# Patient Record
Sex: MALE | Race: WHITE | HISPANIC OR LATINO | Employment: FULL TIME | URBAN - METROPOLITAN AREA
[De-identification: names, ages, dates, MRNs, and addresses within clinical notes are randomized per-mention and may not be internally consistent; named-entity substitution may affect disease eponyms.]

---

## 2022-04-27 ENCOUNTER — OCCUPATIONAL MEDICINE (OUTPATIENT)
Dept: URGENT CARE | Facility: CLINIC | Age: 37
End: 2022-04-27
Payer: COMMERCIAL

## 2022-04-27 VITALS
TEMPERATURE: 98 F | HEIGHT: 63 IN | OXYGEN SATURATION: 98 % | BODY MASS INDEX: 22.15 KG/M2 | HEART RATE: 64 BPM | SYSTOLIC BLOOD PRESSURE: 120 MMHG | DIASTOLIC BLOOD PRESSURE: 60 MMHG | RESPIRATION RATE: 14 BRPM | WEIGHT: 125 LBS

## 2022-04-27 DIAGNOSIS — S39.012A STRAIN OF LUMBAR REGION, INITIAL ENCOUNTER: ICD-10-CM
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
## 2022-04-27 DIAGNOSIS — Z02.1 PRE-EMPLOYMENT DRUG SCREENING: ICD-10-CM

## 2022-04-27 LAB
AMP AMPHETAMINE: NORMAL
BREATH ALCOHOL COMMENT: NORMAL
COC COCAINE: NORMAL
INT CON NEG: NORMAL
INT CON POS: NORMAL
MET METHAMPHETAMINES: NORMAL
OPI OPIATES: NORMAL
PCP PHENCYCLIDINE: NORMAL
POC BREATHALIZER: 0 PERCENT (ref 0–0.01)
POC DRUG COMMENT 753798-OCCUPATIONAL HEALTH: NEGATIVE
THC: NORMAL

## 2022-04-27 PROCEDURE — 80305 DRUG TEST PRSMV DIR OPT OBS: CPT | Performed by: FAMILY MEDICINE

## 2022-04-27 PROCEDURE — 99203 OFFICE O/P NEW LOW 30 MIN: CPT | Performed by: FAMILY MEDICINE

## 2022-04-27 PROCEDURE — 82075 ASSAY OF BREATH ETHANOL: CPT | Performed by: FAMILY MEDICINE

## 2022-04-27 NOTE — LETTER
[facility] [address] - [place] [address]  Phone:  [phone] - Fax:  [phone]   [facility] Progress Report and Disability Certification  Date of Service: 4/27/2022   No Show:  No  Date / Time of Next Visit: 5/4/2022 AT 10:30 AM   Claim Information   Patient Name: Aishwarya Catalan  Claim Number:     Employer:  [employer] Date of Injury: 4/25/2022     Insurer / TPA: [employer]  ID / SSN:     Occupation: [employer] Diagnosis: The encounter diagnosis was Strain of lumbar region, initial encounter.    Medical Information   Related to Industrial Injury? Yes    Subjective Complaints:      DOI:  25 April          Back Pain  This is a new problem.    Pt was leaning over to lower a heavy log and felt acute pain in the lower back.          The problem occurs constantly. The problem is unchanged. The pain is present in the lumbar spine. The quality of the pain is described as aching. The pain does not radiate. The pain is moderate.  Pain is worse with sitting and walking.          Pertinent negatives include no bladder incontinence, bowel incontinence, dysuria, fever, headaches, numbness or weakness. Treatments tried: motrin.  The treatment provided no relief.    Objective Findings:             Lumbar spine: pt exhibits decreased range of motion,  and bilat muscular tenderness.   No muscle spasms . Pt exhibits no bony tenderness, no swelling, no edema, no deformity  .   Reflex Scores:       Patellar reflexes are 2+ on the right side and 2+ on the left side.  STRAIGHT LEG RAISE, EVA NEGATIVE BILAT   Pre-Existing Condition(s):     Assessment:   Initial Visit    Status: Additional Care Required  Permanent Disability:No    Plan: Medication    Diagnostics:      Comments:       Disability Information   Status: Released to Restricted Duty    From:  4/27/2022  Through: 5/4/2022 Restrictions are: Temporary    Physical Restrictions   Sitting:  < or = to 1 hr/day Standing:    Stooping:    Bending:      Squatting:    Walking:  < or = to 1 hr/day Climbing:    Pushing:      Pulling:    Other:    Reaching Above Shoulder (L):   Reaching Above Shoulder (R):       Reaching Below Shoulder (L):    Reaching Below Shoulder (R):      Not to exceed Weight Limits   Carrying(hrs):   Weight Limit(lb): < or = to 10 pounds Lifting(hrs):   Weight  Limit(lb): < or = to 10 pounds   Comments: Strain of lumbar region, initial encounter  Restrictions per D39    - diclofenac sodium (VOLTAREN) 1 % Gel; Apply 4 g topically every 8 hours as needed (pain).  Dispense: 100 g; Refill: 0    F/u one week    Repetitive Actions   Hands: i.e. Fine Manipulations from Grasping:     Feet: i.e. Operating Foot Controls:     Driving / Operate Machinery:     Health Care Provider’s Original or Electronic Signature  Niko Mock M.D. Health Care Provider’s Original or Electronic Signature    Alexy Oneal MD         Clinic Name / Location: [address]  [place] [address] Clinic Phone Number: Dept: [phone]   Appointment Time: 11:00 Am Visit Start Time: 12:33 PM   Check-In Time:  11:21 Am Visit Discharge Time:    Original-Treating Physician or Chiropractor    Page 2-Insurer/TPA    Page 3-Employer    Page 4-Employee

## 2022-04-27 NOTE — LETTER
"EMPLOYEE’S CLAIM FOR COMPENSATION/ REPORT OF INITIAL TREATMENT  FORM C-4    EMPLOYEE’S CLAIM - PROVIDE ALL INFORMATION REQUESTED   First Name  Aishwarya Last Name  Alfonso Catalan Birthdate                    1985                Sex  male Claim Number (Insurer’s Use Only)    Home Address  [address] Age  37 y.o. Height  1.6 m (5' 2.99\") Weight  56.7 kg (125 lb) [facility]     [facility] Zip  [address] Telephone  [phone] (home)    Mailing Address  [address] [facility] Zip  [address] Primary Language Spoken  Filipino    Insurer   Third-Party   [employer]   Employee's Occupation (Job Title) When Injury or Occupational Disease Occurred  [employer]IVIA    Employer's Name/Company Name    [employer]  Telephone   [phone]   Office Mail Address (Number and Street)   [address]  [facility]  Zip  [address]    Date of Injury  4/25/2022               Hours Injury  11:00 AM Date Employer Notified  4/25/2022 Last Day of Work after Injury     or Occupational Disease  4/25/2022 Supervisor to Whom Injury     Reported  NIKIA   Address or Location of Accident (if applicable)  Work [1]   What were you doing at the time of accident? (if applicable)  [employer]    How did this injury or occupational disease occur? (Be specific an answer in detail. Use additional sheet if necessary)  [employer]   If you believe that you have an occupational disease, when did you first have knowledge of the disability and it relationship to your employment?  N/A Witnesses to the Accident  NIKIA      Nature of Injury or Occupational Disease  Strain  Part(s) of Body Injured or Affected  Lower Back Area (Lumbar Area & Lumbo-Sacral), N/A, N/A    I certify that the above is true and correct to the best of my knowledge and that I have provided this information in " order to obtain the benefits of [place] or [place] 617 of [place]).  I hereby authorize any physician, chiropractor, surgeon, practitioner, or other person, any hospital, including [facility] or [facility] hospital, any medical service organization, any insurance company, or other institution or organization to release to each other, any medical or other information, including benefits paid or payable, pertinent to this injury or disease, except information relative to diagnosis, treatment and/or counseling for AIDS, psychological conditions, alcohol or controlled substances, for which I must give specific authorization.  A Photostat of this authorization shall be as valid as the original.     Date   Place Employee’s Original or  *Electronic Signature   THIS REPORT MUST BE COMPLETED AND MAILED WITHIN 3 WORKING DAYS OF TREATMENT   Place  [facility]  Name of Facility  [facility]   Date  4/27/2022 Diagnosis and Description of Injury or Occupational Disease  (S39.012A) Strain of lumbar region, initial encounter Is there evidence the injured employee was under the influence of alcohol and/or another controlled substance at the time of accident?  ? No ? Yes (if yes, please explain)    Hour  12:33 PM   The encounter diagnosis was Strain of lumbar region, initial encounter. No   Treatment  Strain of lumbar region, initial encounter  Restrictions per D39    - diclofenac sodium (VOLTAREN) 1 % Gel; Apply 4 g topically every 8 hours as needed (pain).  Dispense: 100 g; Refill: 0    F/u one week  Have you advised the patient to remain off work five days or     more?    X-Ray Findings      ? Yes Indicate dates:   From   To      From information given by the employee, together with medical evidence, can        you directly connect this injury or occupational disease as job incurred?  Yes ? No If no, is the injured employee  "capable of:  ? full duty  No ? modified duty  Yes   Is additional medical care by a physician indicated?  Yes If Modified Duty, Specify any Limitations / Restrictions  Restrictions per D39     Do you know of any previous injury or disease contributing to this condition or occupational disease?  ? Yes ? No (Explain if yes)                          No   Date  4/27/2022 Print Health Care Provider's   Niko Mock M.D. I certify the employer’s copy of  this form was mailed on:   Address  [address] Insurer’s Use Only     [facility] Zip  [address]    Provider’s Tax ID Number  [id number] Telephone  Dept: [phone]             Health Care Provider’s Original or Electronic Signature  e-NIKO Villagomez M.D. Degree (MD,DO, DC,PAGarrettC,APRN)   M.D.      * Complete and attach Release of Information (Form C-4A) when injured employee signs C-4 Form electronically  ORIGINAL - TREATING HEALTHCARE PROVIDER PAGE 2 - INSURER/TPA PAGE 3 - EMPLOYER PAGE 4 - EMPLOYEE             Form C-4 (rev.08/21)           BRIEF DESCRIPTION OF RIGHTS AND BENEFITS  (Pursuant to [place] 616C.050)    Notice of Injury or Occupational Disease (Incident Report Form C-1): If an injury or occupational disease (OD) arises out of and in the course of employment, you must provide written notice to your employer as soon as practicable, but no later than 7 days after the accident or OD. Your employer shall maintain a sufficient supply of the required forms.    Claim for Compensation (Form C-4): If medical treatment is sought, the form C-4 is available at the place of initial treatment. A completed \"Claim for Compensation\" (Form C-4) must be filed within 90 days after an accident or OD. The treating physician or chiropractor must, within 3 working days after treatment, complete and mail to the employer, the employer's insurer and third-party , the Claim for Compensation.    Medical Treatment: If you require " medical treatment for your on-the-job injury or OD, you may be required to select a physician or chiropractor from a list provided by your workers’ compensation insurer, if it has contracted with an Organization for Managed Care (MCO) or Preferred Provider Organization (PPO) or providers of health care. If your employer has not entered into a contract with an MCO or PPO, you may select a physician or chiropractor from the Panel of Physicians and Chiropractors. Any medical costs related to your industrial injury or OD will be paid by your insurer.    Temporary Total Disability (TTD): If your doctor has certified that you are unable to work for a period of at least 5 consecutive days, or 5 cumulative days in a 20-day period, or places restrictions on you that your employer does not accommodate, you may be entitled to TTD compensation.    Temporary Partial Disability (TPD): If the wage you receive upon reemployment is less than the compensation for TTD to which you are entitled, the insurer may be required to pay you TPD compensation to make up the difference. TPD can only be paid for a maximum of 24 months.    Permanent Partial Disability (PPD): When your medical condition is stable and there is an indication of a PPD as a result of your injury or OD, within 30 days, your insurer must arrange for an evaluation by a rating physician or chiropractor to determine the degree of your PPD. The amount of your PPD award depends on the date of injury, the results of the PPD evaluation, your age and wage.    Permanent Total Disability (PTD): If you are medically certified by a treating physician or chiropractor as permanently and totally disabled and have been granted a PTD status by your insurer, you are entitled to receive monthly benefits not to exceed 66 2/3% of your average monthly wage. The amount of your PTD payments is subject to reduction if you previously received a lump-sum PPD award.    Vocational Rehabilitation  Services: You may be eligible for vocational rehabilitation services if you are unable to return to the job due to a permanent physical impairment or permanent restrictions as a result of your injury or occupational disease.    Transportation and Per [place] Reimbursement: You may be eligible for travel expenses and per [place] associated with medical treatment.    Reopening: You may be able to reopen your claim if your condition worsens after claim closure.     Appeal Process: If you disagree with a written determination issued by the insurer or the insurer does not respond to your request, you may appeal to the Department of Administration, , by following the instructions contained in your determination letter. You must appeal the determination within 70 days from the date of the determination letter at [address], [address], [place], [place] [address], or [address] [facility] [address] [facility] [address], [place], [place] [address]. If you disagree with the  decision, you may appeal to the Department of Administration, . You must file your appeal within 30 days from the date of the  decision letter at [address], [address], [place], [place] [address], or [address], [address], [place], [place] [address]. If you disagree with a decision of an , you may file a petition for judicial review with the District Court. You must do so within 30 days of the Appeal Officer’s decision. You may be represented by an  at your own expense or you may contact the [facility] for possible representation.    [place]  for Injured Workers (NAIW): If you disagree with a  decision, you may request that NAIW represent you without charge at an  Hearing. For information regarding denial of benefits, you may contact the [facility] at: [address], [address], [place] [address], [phone], or [address],  [address], [place] [address], [phone]    To File a Complaint with the Division: If you wish to file a complaint with the  of the Division of Industrial Relations (DIR),  please contact the Workers’ Compensation Section, [address], [address], [place], [place] [address], telephone [phone], or [address], [place], [place] [address], telephone [phone].    For assistance with Workers’ Compensation Issues: You may contact the [facility] Office for Consumer Health Assistance, [address], [place], [place] [address], Toll Free 1-860.439.9502, Web site: http://[facility].[place].gov/Programs/MICHAEL E-mail: [email or web address].[email or web address].[facility]              __________________________________________________________________                                    _________________            Employee Name / Signature                                                                                                                            Date                                                                                                                                                                                                                              D-2 (rev. 10/20)

## 2022-04-27 NOTE — PROGRESS NOTES
"Chief Complaint   Patient presents with   • Back Pain     WC DOI 4/25/22, Lower Back injury, as he was leaning to lower a wood piece at work his back got stuck and could not get back up.       A qualified  was used to interpret Kinyarwanda during this encounter.  ’s name/ID number was noted other and mode of interpretation was ipad.  .  The content of the interpretation included History/Visit information.        DOI:  25 April          Back Pain  This is a new problem.    Pt was leaning over to lower a heavy log and felt acute pain in the lower back.          The problem occurs constantly. The problem is unchanged. The pain is present in the lumbar spine. The quality of the pain is described as aching. The pain does not radiate. The pain is moderate.  Pain is worse with sitting and walking.          Pertinent negatives include no bladder incontinence, bowel incontinence, dysuria, fever, headaches, numbness or weakness. Treatments tried: motrin.  The treatment provided no relief.     Social History     Tobacco Use   • Smoking status: Never Smoker   • Smokeless tobacco: Never Used   Vaping Use   • Vaping Use: Never used   Substance Use Topics   • Alcohol use: Yes     Comment: occ   • Drug use: Never       Family hx was reviewed - no pertinent past family hx      Past medical history was unremarkable and not pertinent to current issue    Review of Systems   Constitutional: Negative for fever.   Gastrointestinal: Negative for bowel incontinence.   Genitourinary: Negative for bladder incontinence, dysuria, urgency and frequency.   Musculoskeletal: Positive for back pain.   Neurological: Negative for weakness, numbness and headaches.   All other systems reviewed and are negative.         Objective:     /60   Pulse 64   Temp 36.7 °C (98 °F) (Temporal)   Resp 14   Ht 1.6 m (5' 2.99\")   Wt 56.7 kg (125 lb)   SpO2 98%     Physical Exam   Constitutional: pt is oriented to person, place, " and time. Pt appears well-developed and well-nourished. No distress.   HENT:   Head: Normocephalic and atraumatic.   Eyes: EOM are normal. Pupils are equal, round, and reactive to light. No scleral icterus.   Neck: Normal range of motion. Neck supple. No thyromegaly present.   Cardiovascular: Normal rate, regular rhythm and normal heart sounds.    Pulmonary/Chest: Effort normal and breath sounds normal. No respiratory distress.  no wheezes.   no rales.  no tenderness.   Musculoskeletal: pt exhibits no edema.                   Lumbar spine: pt exhibits decreased range of motion,  and bilat muscular tenderness.   No muscle spasms . Pt exhibits no bony tenderness, no swelling, no edema, no deformity  .   Reflex Scores:       Patellar reflexes are 2+ on the right side and 2+ on the left side.  STRAIGHT LEG RAISE, EVA NEGATIVE BILAT  Neurological: patient is alert and oriented to person, place, and time. Patient has normal reflexes. No cranial nerve deficit. Patient exhibits normal muscle tone.     Skin: Skin is warm and dry. No rash noted. No erythema.   Psychiatric: patient has a normal mood and affect.  behavior is normal.   Nursing note and vitals reviewed.              Assessment/Plan:        1. Strain of lumbar region, initial encounter  Restrictions per D39    - diclofenac sodium (VOLTAREN) 1 % Gel; Apply 4 g topically every 8 hours as needed (pain).  Dispense: 100 g; Refill: 0    F/u one week

## 2022-05-02 ENCOUNTER — OCCUPATIONAL MEDICINE (OUTPATIENT)
Dept: URGENT CARE | Facility: CLINIC | Age: 37
End: 2022-05-02
Payer: COMMERCIAL

## 2022-05-02 VITALS
TEMPERATURE: 97.1 F | HEART RATE: 62 BPM | SYSTOLIC BLOOD PRESSURE: 124 MMHG | RESPIRATION RATE: 20 BRPM | BODY MASS INDEX: 22.15 KG/M2 | DIASTOLIC BLOOD PRESSURE: 70 MMHG | HEIGHT: 63 IN | OXYGEN SATURATION: 98 % | WEIGHT: 125 LBS

## 2022-05-02 DIAGNOSIS — S39.012S: ICD-10-CM
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
## 2022-05-02 PROCEDURE — 99213 OFFICE O/P EST LOW 20 MIN: CPT | Performed by: PHYSICIAN ASSISTANT

## 2022-05-02 NOTE — LETTER
"   [facility] [place]  [address] - [place] [address]  Phone:  [phone] - Fax:  [phone]   [facility] Progress Report and Disability Certification  Date of Service: 5/2/2022   No Show:  No  Date / Time of Next Visit: 5/7/2022   Claim Information   Patient Name: Aishwarya Catalan  Claim Number:     Employer:   [employer] Date of Injury: 4/25/2022     Insurer / TPA: [employer] ID / SSN:     Occupation: [employer]OR  Diagnosis: Diagnoses of Strain of lumbar spine, sequela and Accident at workplace were pertinent to this visit.    Medical Information   Related to Industrial Injury? Yes    Subjective Complaints:  DOI 4/25/22.  11 AM    This is a new problem.    Pt was leaning over to lower a heavy log and felt acute pain in the lower back. The pain is present in the lumbar spine. The quality of the pain is described as aching. The pain does not radiate. The pain is moderate.  Pain is worse with sitting and walking.         Pertinent negatives include no bladder incontinence, bowel incontinence, dysuria, fever, headaches, numbness or weakness. Treatments tried: motrin.  The treatment provided no relief.     Update 5/2/22:  Patient reports improvement since last visit.  He reports his pain is completely gone.  Since he was last seen he has been taking OTC pain medications as needed.  He has been on modified duty        Objective Findings: /60   Pulse 64   Temp 36.7 °C (98 °F) (Temporal)   Resp 14   Ht 1.6 m (5' 2.99\")   Wt 56.7 kg (125 lb)   SpO2 98%      Physical Exam   Constitutional: pt is oriented to person, place, and time. Pt appears well-developed and well-nourished. No distress.   HENT:   Head: Normocephalic and atraumatic.   Eyes: EOM are normal. Pupils are equal, round, and reactive to light. No scleral icterus.   Neck: Normal range of motion. Neck supple. No thyromegaly present. "   Cardiovascular: Normal rate, regular rhythm and normal heart sounds.    Pulmonary/Chest: Effort normal and breath sounds normal. No respiratory distress.  no wheezes.   no rales.  no tenderness.   Musculoskeletal: pt exhibits no edema.                Lumbar spine: There is mild tenderness to lower lumbar spine and lumbosacral junction.  Lumbar spine range of motion is full.  Motor strength is 5/5 to the bilateral lower extremities.  Straight leg raise is negative.  Sensation is intact.  Gait is nonantalgic.  DTRs are 1+.       Skin: Skin is warm and dry. No rash noted. No erythema.   Psychiatric: patient has a normal mood and affect.  behavior is normal.   Nursing note and vitals reviewed.     Pre-Existing Condition(s):     Assessment:   Condition Improved    Status: Additional Care Required  Permanent Disability:No    Plan: Medication  Comments:otc medications tylenol and ibuprofen    Diagnostics:      Comments:       Disability Information   Status: Released to Full Duty    From:  5/2/2022  Through: 5/7/2022 Restrictions are: Temporary   Physical Restrictions   Sitting:    Standing:    Stooping:    Bending:      Squatting:    Walking:    Climbing:    Pushing:      Pulling:    Other:    Reaching Above Shoulder (L):   Reaching Above Shoulder (R):       Reaching Below Shoulder (L):    Reaching Below Shoulder (R):      Not to exceed Weight Limits   Carrying(hrs):   Weight Limit(lb):   Lifting(hrs):   Weight  Limit(lb):     Comments: Released for a trial of full duty this week to be certain he can tolerate work demands  Continue tylenol, ibuprofen, ice as needed  Reassess in 5 days time.  Anticipate MMI at that visit      Repetitive Actions   Hands: i.e. Fine Manipulations from Grasping:     Feet: i.e. Operating Foot Controls:     Driving / Operate Machinery:     Health Care Provider’s Original or Electronic Signature  Concepción Mathew P.A.-C. Health Care Provider’s Original or Electronic Signature    Alexy  MD Kimmy         Clinic Name / Location: [facility] [place]  [address] [place] [address] [place] [address] Clinic Phone Number: Dept: [phone]   Appointment Time: 8:15 Am Visit Start Time: 8:14 AM   Check-In Time:  8:04 Am Visit Discharge Time:  09/04 AM   Original-Treating Physician or Chiropractor    Page 2-Insurer/TPA    Page 3-Employer    Page 4-Employee

## 2022-05-02 NOTE — PROGRESS NOTES
"Sherlyn Catalan is a 37 y.o. male who presents with Follow-Up (Feels ready to be released to go back to full duty, DOI: 04/25/22, lower back area)      DOI 4/25/22.  11 AM    This is a new problem.    Pt was leaning over to lower a heavy log and felt acute pain in the lower back. The pain is present in the lumbar spine. The quality of the pain is described as aching. The pain does not radiate. The pain is moderate.  Pain is worse with sitting and walking.         Pertinent negatives include no bladder incontinence, bowel incontinence, dysuria, fever, headaches, numbness or weakness. Treatments tried: motrin.  The treatment provided no relief.     Update 5/2/22:  Patient reports improvement since last visit.  He reports his pain is completely gone.  Since he was last seen he has been taking OTC pain medications as needed.  He has been on modified duty                   Objective     /70 (BP Location: Right arm, Patient Position: Sitting, BP Cuff Size: Adult)   Pulse 62   Temp 36.2 °C (97.1 °F) (Temporal)   Resp 20   Ht 1.6 m (5' 3\")   Wt 56.7 kg (125 lb)   SpO2 98%   BMI 22.14 kg/m²      Physical Exam    /60   Pulse 64   Temp 36.7 °C (98 °F) (Temporal)   Resp 14   Ht 1.6 m (5' 2.99\")   Wt 56.7 kg (125 lb)   SpO2 98%      Physical Exam   Constitutional: pt is oriented to person, place, and time. Pt appears well-developed and well-nourished. No distress.   HENT:   Head: Normocephalic and atraumatic.   Eyes: EOM are normal. Pupils are equal, round, and reactive to light. No scleral icterus.   Neck: Normal range of motion. Neck supple. No thyromegaly present.   Cardiovascular: Normal rate, regular rhythm and normal heart sounds.    Pulmonary/Chest: Effort normal and breath sounds normal. No respiratory distress.  no wheezes.   no rales.  no tenderness.   Musculoskeletal: pt exhibits no edema.                Lumbar spine: There is mild tenderness to lower lumbar spine and " lumbosacral junction.  Lumbar spine range of motion is full.  Motor strength is 5/5 to the bilateral lower extremities.  Straight leg raise is negative.  Sensation is intact.  Gait is nonantalgic.  DTRs are 1+.       Skin: Skin is warm and dry. No rash noted. No erythema.   Psychiatric: patient has a normal mood and affect.  behavior is normal.   Nursing note and vitals reviewed.                     Assessment & Plan        1. Strain of lumbar spine, sequela      2. Accident at workplace       Released for a trial of full duty this week to be certain he can tolerate work demands  Continue tylenol, ibuprofen, ice as needed  Reassess in 5 days time.  Anticipate MMI at that visit

## 2022-05-07 ENCOUNTER — OCCUPATIONAL MEDICINE (OUTPATIENT)
Dept: URGENT CARE | Facility: CLINIC | Age: 37
End: 2022-05-07
Payer: COMMERCIAL

## 2022-05-07 VITALS
SYSTOLIC BLOOD PRESSURE: 110 MMHG | WEIGHT: 125 LBS | TEMPERATURE: 97.8 F | HEART RATE: 66 BPM | BODY MASS INDEX: 22.15 KG/M2 | RESPIRATION RATE: 14 BRPM | OXYGEN SATURATION: 98 % | DIASTOLIC BLOOD PRESSURE: 60 MMHG | HEIGHT: 63 IN

## 2022-05-07 DIAGNOSIS — S39.012S: ICD-10-CM

## 2022-05-07 PROCEDURE — 99213 OFFICE O/P EST LOW 20 MIN: CPT | Performed by: PHYSICIAN ASSISTANT

## 2022-05-07 ASSESSMENT — ENCOUNTER SYMPTOMS: BACK PAIN: 1

## 2022-05-07 NOTE — PROGRESS NOTES
"Sherlyn Catalan is a 37 y.o. male who presents with Back Injury (WC  4/25/22 follow-up, Back injury at work.)      DOI 4/25/2022: This is the patient's third visit regarding his work-related injury.  Had trial of full duty at last time.  Patient has been tolerating well with no issues at work.  He states he is still taking the diclofenac but has not had any pain in his back.  He is ready to be released to full duty and discharge at this time.   Mohawk-speaking  present throughout visit today.  HPI  Patient presents today for follow-up of work-related injury as described above.  Review of Systems   Musculoskeletal: Positive for back pain (Improved).     PMH: No pertinent past medical history to this problem  MEDS: Medications were reviewed in Epic  ALLERGIES: Allergies were reviewed in Epic  FH: No pertinent family history to this problem       Objective     /60   Pulse 66   Temp 36.6 °C (97.8 °F) (Temporal)   Resp 14   Ht 1.6 m (5' 2.99\")   Wt 56.7 kg (125 lb)   SpO2 98%   BMI 22.15 kg/m²      Physical Exam  Vitals and nursing note reviewed.   Constitutional:       General: He is not in acute distress.     Appearance: Normal appearance. He is well-developed.   HENT:      Head: Normocephalic and atraumatic.      Right Ear: Hearing normal.      Left Ear: Hearing normal.   Cardiovascular:      Rate and Rhythm: Normal rate.   Pulmonary:      Effort: Pulmonary effort is normal.   Musculoskeletal:      Comments: Low back as described below   Skin:     General: Skin is warm and dry.   Neurological:      Mental Status: He is alert.      Coordination: Coordination normal.   Psychiatric:         Mood and Affect: Mood normal.         Patient has full range of motion with flexion and extension of the lower extremities and upper extremities.  Patient is able to flex at the hips and has normal range of motion with twisting.  Neurovascular intact distally.  No tenderness to " palpation to the lower back.  Normal gait.              Assessment & Plan   1. Strain of lumbar spine, sequela       Patient is discharged from our care at this time.  Recommend continuing to use ice or heat to the area and perform stretching before and after activities.   Please note that this dictation was created using voice recognition software. I have made every reasonable attempt to correct obvious errors, but I expect that there may be errors of grammar and possibly content that I did not discover before finalizing the note.

## 2022-05-07 NOTE — LETTER
[facility] [address] - [place] [address]  Phone:  [phone] - Fax:  [phone]   [facility] Progress Report and Disability Certification  Date of Service: 5/7/2022   No Show:  No  Date / Time of Next Visit:     Claim Information   Patient Name: Aishwarya Catalan  Claim Number:     Employer:   *** Date of Injury: 4/25/2022     Insurer / TPA: [employer] *** ID / SSN:     Occupation: [employer] *** Diagnosis: The encounter diagnosis was Strain of lumbar spine, sequela.    Medical Information   Related to Industrial Injury? Yes ***   Subjective Complaints:  DOI 4/25/2022: This is the patient's third visit regarding his work-related injury.  Had trial of full duty at last time.  Patient has been tolerating well with no issues at work.  He states he is still taking the diclofenac but has not had any pain in his back.  He is ready to be released to full duty and discharge at this time.   Objective Findings: Patient has full range of motion with flexion and extension of the lower extremities and upper extremities.  Patient is able to flex at the hips and has normal range of motion with twisting.  Neurovascular intact distally.  No tenderness to palpation to the lower back.  Normal gait.   Pre-Existing Condition(s):     Assessment:   Condition Improved    Status: Discharged /  MMI  Permanent Disability:No    Plan:      Diagnostics:      Comments:       Disability Information   Status: Released to Full Duty    From:     Through:   Restrictions are:     Physical Restrictions   Sitting:    Standing:    Stooping:    Bending:      Squatting:    Walking:    Climbing:    Pushing:      Pulling:    Other:    Reaching Above Shoulder (L):   Reaching Above Shoulder (R):       Reaching Below Shoulder (L):    Reaching Below Shoulder (R):      Not to exceed Weight Limits   Carrying(hrs):   Weight Limit(lb):   Lifting(hrs):   Weight   Limit(lb):     Comments: Patient is discharged from our care at this time.  Recommend continuing to use ice or heat to the area and perform stretching before and after activities.    Repetitive Actions   Hands: i.e. Fine Manipulations from Grasping:     Feet: i.e. Operating Foot Controls:     Driving / Operate Machinery:     Health Care Provider’s Original or Electronic Signature  Mike Hobbs P.A.-C. Health Care Provider’s Original or Electronic Signature    Alexy Oneal MD         Clinic Name / Location: [address]  [place] [address] Clinic Phone Number: Dept: [phone]   Appointment Time: 10:00 Am Visit Start Time: 11:29 AM   Check-In Time:  9:59 Am Visit Discharge Time:  ***   Original-Treating Physician or Chiropractor    Page 2-Insurer/TPA    Page 3-Employer    Page 4-Employee

## 2024-07-16 ENCOUNTER — OCCUPATIONAL MEDICINE (OUTPATIENT)
Dept: URGENT CARE | Facility: CLINIC | Age: 39
End: 2024-07-16
Payer: COMMERCIAL

## 2024-07-16 VITALS
TEMPERATURE: 97 F | RESPIRATION RATE: 18 BRPM | BODY MASS INDEX: 19.62 KG/M2 | SYSTOLIC BLOOD PRESSURE: 128 MMHG | HEART RATE: 62 BPM | DIASTOLIC BLOOD PRESSURE: 68 MMHG | WEIGHT: 125 LBS | OXYGEN SATURATION: 99 % | HEIGHT: 67 IN

## 2024-07-16 DIAGNOSIS — M54.50 ACUTE BILATERAL LOW BACK PAIN WITHOUT SCIATICA: ICD-10-CM
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
## 2024-07-16 DIAGNOSIS — S39.012A STRAIN OF LUMBAR REGION, INITIAL ENCOUNTER: Primary | ICD-10-CM

## 2024-07-16 RX ORDER — CYCLOBENZAPRINE HCL 10 MG
10 TABLET ORAL 3 TIMES DAILY PRN
Qty: 30 TABLET | Refills: 0 | Status: SHIPPED | OUTPATIENT
Start: 2024-07-16

## 2024-07-16 RX ORDER — LIDOCAINE 50 MG/G
1 PATCH TOPICAL EVERY 24 HOURS
Qty: 10 PATCH | Refills: 0 | Status: SHIPPED | OUTPATIENT
Start: 2024-07-16

## 2024-07-16 RX ORDER — KETOROLAC TROMETHAMINE 15 MG/ML
15 INJECTION, SOLUTION INTRAMUSCULAR; INTRAVENOUS ONCE
Status: COMPLETED | OUTPATIENT
Start: 2024-07-16 | End: 2024-07-16

## 2024-07-16 RX ADMIN — KETOROLAC TROMETHAMINE 15 MG: 15 INJECTION, SOLUTION INTRAMUSCULAR; INTRAVENOUS at 16:56

## 2024-07-16 ASSESSMENT — ENCOUNTER SYMPTOMS
EYE PAIN: 0
BACK PAIN: 1
FLANK PAIN: 0
MYALGIAS: 0
FEVER: 0
NAUSEA: 0
HEADACHES: 0
STRIDOR: 0
SHORTNESS OF BREATH: 0
EYE DISCHARGE: 0
VOMITING: 0
DIARRHEA: 0
EYE REDNESS: 0
CHILLS: 0
COUGH: 0
SPUTUM PRODUCTION: 0
WHEEZING: 0
SORE THROAT: 0
DIZZINESS: 0
PALPITATIONS: 0
ABDOMINAL PAIN: 0

## 2024-07-21 ENCOUNTER — OCCUPATIONAL MEDICINE (OUTPATIENT)
Dept: URGENT CARE | Facility: CLINIC | Age: 39
End: 2024-07-21
Payer: COMMERCIAL

## 2024-07-21 VITALS
WEIGHT: 122 LBS | HEIGHT: 63 IN | SYSTOLIC BLOOD PRESSURE: 124 MMHG | DIASTOLIC BLOOD PRESSURE: 85 MMHG | OXYGEN SATURATION: 98 % | TEMPERATURE: 98 F | BODY MASS INDEX: 21.62 KG/M2 | HEART RATE: 63 BPM | RESPIRATION RATE: 20 BRPM

## 2024-07-21 DIAGNOSIS — S39.012D LUMBAR STRAIN, SUBSEQUENT ENCOUNTER: ICD-10-CM

## 2024-07-21 PROCEDURE — 3074F SYST BP LT 130 MM HG: CPT | Performed by: REGISTERED NURSE

## 2024-07-21 PROCEDURE — 99213 OFFICE O/P EST LOW 20 MIN: CPT | Performed by: REGISTERED NURSE

## 2024-07-21 PROCEDURE — 3079F DIAST BP 80-89 MM HG: CPT | Performed by: REGISTERED NURSE

## 2024-07-28 ENCOUNTER — OCCUPATIONAL MEDICINE (OUTPATIENT)
Dept: URGENT CARE | Facility: CLINIC | Age: 39
End: 2024-07-28
Payer: COMMERCIAL

## 2024-07-28 VITALS
BODY MASS INDEX: 21.62 KG/M2 | DIASTOLIC BLOOD PRESSURE: 78 MMHG | OXYGEN SATURATION: 97 % | RESPIRATION RATE: 20 BRPM | TEMPERATURE: 97.9 F | HEIGHT: 63 IN | SYSTOLIC BLOOD PRESSURE: 118 MMHG | WEIGHT: 122 LBS | HEART RATE: 63 BPM

## 2024-07-28 DIAGNOSIS — S39.012D LUMBAR STRAIN, SUBSEQUENT ENCOUNTER: ICD-10-CM
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
## 2024-07-28 PROCEDURE — 99213 OFFICE O/P EST LOW 20 MIN: CPT

## 2024-07-28 PROCEDURE — 3078F DIAST BP <80 MM HG: CPT

## 2024-07-28 PROCEDURE — 3074F SYST BP LT 130 MM HG: CPT
